# Patient Record
Sex: FEMALE | URBAN - METROPOLITAN AREA
[De-identification: names, ages, dates, MRNs, and addresses within clinical notes are randomized per-mention and may not be internally consistent; named-entity substitution may affect disease eponyms.]

---

## 2022-12-13 ENCOUNTER — OUTSIDE PLACE OF SERVICE (OUTPATIENT)
Dept: GASTROENTEROLOGY | Facility: CLINIC | Age: 62
End: 2022-12-13
Payer: MEDICARE

## 2022-12-13 ENCOUNTER — OUTSIDE PLACE OF SERVICE (OUTPATIENT)
Dept: GASTROENTEROLOGY | Facility: CLINIC | Age: 62
End: 2022-12-13

## 2022-12-13 PROCEDURE — 99223 PR INITIAL HOSPITAL CARE,LEVL III: ICD-10-PCS | Mod: 25,,, | Performed by: INTERNAL MEDICINE

## 2022-12-13 PROCEDURE — 43248 PR EGD, FLEX, W/DILATION OVER GUIDEWIRE: ICD-10-PCS | Mod: ,,, | Performed by: INTERNAL MEDICINE

## 2022-12-13 PROCEDURE — 43248 EGD GUIDE WIRE INSERTION: CPT | Mod: ,,, | Performed by: INTERNAL MEDICINE

## 2022-12-13 PROCEDURE — 99223 1ST HOSP IP/OBS HIGH 75: CPT | Mod: 25,,, | Performed by: INTERNAL MEDICINE

## 2023-09-20 ENCOUNTER — OUTSIDE PLACE OF SERVICE (OUTPATIENT)
Dept: INTERVENTIONAL RADIOLOGY/VASCULAR | Facility: CLINIC | Age: 63
End: 2023-09-20
Payer: MEDICARE

## 2023-09-20 PROCEDURE — 74230 X-RAY XM SWLNG FUNCJ C+: CPT | Mod: 26,,, | Performed by: RADIOLOGY

## 2023-09-20 PROCEDURE — 74230 PR XRAY, SWALLOW FUNCT, CINE/VIDEO, W/ SCOUT NECK RADIOGRAPH/IMG, W/CONTRAST: ICD-10-PCS | Mod: 26,,, | Performed by: RADIOLOGY

## 2023-12-06 ENCOUNTER — TELEPHONE (OUTPATIENT)
Dept: PRIMARY CARE CLINIC | Facility: CLINIC | Age: 63
End: 2023-12-06
Payer: MEDICARE

## 2023-12-06 NOTE — TELEPHONE ENCOUNTER
----- Message from Mic Garner sent at 12/6/2023  1:29 PM CST -----  Contact: Kailee Carlson is calling in regards to getting an appt to refill on all medications. Please call back at [phone]                Thanks  SW

## 2024-08-18 ENCOUNTER — OUTSIDE PLACE OF SERVICE (OUTPATIENT)
Dept: GASTROENTEROLOGY | Facility: CLINIC | Age: 64
End: 2024-08-18
Payer: MEDICAID

## 2024-08-18 ENCOUNTER — OUTSIDE PLACE OF SERVICE (OUTPATIENT)
Dept: GASTROENTEROLOGY | Facility: CLINIC | Age: 64
End: 2024-08-18

## 2024-08-20 ENCOUNTER — OUTSIDE PLACE OF SERVICE (OUTPATIENT)
Dept: GASTROENTEROLOGY | Facility: CLINIC | Age: 64
End: 2024-08-20
Payer: MEDICAID

## 2024-08-22 ENCOUNTER — TELEPHONE (OUTPATIENT)
Dept: GASTROENTEROLOGY | Facility: CLINIC | Age: 64
End: 2024-08-22
Payer: MEDICAID

## 2024-08-22 NOTE — TELEPHONE ENCOUNTER
Called and spoke with patient's , he stated that wife can not speak very well and he handles her affairs.     was informed that unfortunately, Dr. Gorman does not take his wife's insurance. He was given recommendations of other GI Providers (Osmel Whitman, [facility] or [facility]). He verbalized understanding of this information. -MANNY, LPN

## 2024-08-22 NOTE — TELEPHONE ENCOUNTER
----- Message from Alissa Higginbotham sent at 8/22/2024  9:09 AM CDT -----  Contact: joce soria is requesting office to give pt a call back regarding scheduling hospital f/u (2 weeks). Please call back at [phone]

## 2024-08-22 NOTE — TELEPHONE ENCOUNTER
Message  Received: Today   Pt Advice  Gabriela Tee Staff  Caller: Unspecified (Today,  1:31 PM)   Patient is requesting a call back in regards to insurance [phone]

## 2024-08-22 NOTE — TELEPHONE ENCOUNTER
Spoke with patient's , he stated that he forgot to tell us that patient has medicaid and wanted to know could his wife be seen now.     was informed that Dr. Gorman does not currently accept Medicaid.     verbalized understanding and would try with a different provider. -[facility], LPN